# Patient Record
Sex: FEMALE | Race: WHITE | Employment: FULL TIME | URBAN - NONMETROPOLITAN AREA
[De-identification: names, ages, dates, MRNs, and addresses within clinical notes are randomized per-mention and may not be internally consistent; named-entity substitution may affect disease eponyms.]

---

## 2021-08-11 ENCOUNTER — APPOINTMENT (OUTPATIENT)
Dept: GENERAL RADIOLOGY | Age: 46
End: 2021-08-11
Payer: MEDICARE

## 2021-08-11 ENCOUNTER — HOSPITAL ENCOUNTER (EMERGENCY)
Age: 46
Discharge: HOME | End: 2021-08-11
Attending: EMERGENCY MEDICINE
Payer: MEDICARE

## 2021-08-11 VITALS
BODY MASS INDEX: 30.73 KG/M2 | OXYGEN SATURATION: 97 % | HEIGHT: 64 IN | WEIGHT: 180 LBS | RESPIRATION RATE: 22 BRPM | HEART RATE: 56 BPM | TEMPERATURE: 97.7 F

## 2021-08-11 DIAGNOSIS — S46.811A STRAIN OF RIGHT TRAPEZIUS MUSCLE, INITIAL ENCOUNTER: Primary | ICD-10-CM

## 2021-08-11 PROCEDURE — 73030 X-RAY EXAM OF SHOULDER: CPT

## 2021-08-11 PROCEDURE — 99283 EMERGENCY DEPT VISIT LOW MDM: CPT

## 2021-08-11 RX ORDER — NAPROXEN 500 MG/1
500 TABLET ORAL 2 TIMES DAILY PRN
Qty: 20 TABLET | Refills: 0 | Status: SHIPPED | OUTPATIENT
Start: 2021-08-11 | End: 2021-08-21

## 2021-08-11 RX ORDER — METHOCARBAMOL 500 MG/1
500 TABLET, FILM COATED ORAL 4 TIMES DAILY
Qty: 40 TABLET | Refills: 0 | Status: SHIPPED | OUTPATIENT
Start: 2021-08-11 | End: 2021-08-21

## 2021-08-11 RX ORDER — LIDOCAINE 50 MG/G
1 PATCH TOPICAL DAILY
Qty: 30 PATCH | Refills: 0 | Status: SHIPPED | OUTPATIENT
Start: 2021-08-11

## 2021-08-11 NOTE — ED PROVIDER NOTES
Emergency Department Encounter    Patient: Indira Garcia  MRN: [id number]  : 1975  Date of Evaluation: 2021  ED Provider:  Leatha Hinson MD    Triage Chief Complaint:   Shoulder Injury (pt reached to scratch back heard a  pop yesterday. very painful with movement. right shoulder)    [place]:  Indira Garcia is a 54 y.o. female that presents with complaint of right shoulder pain. It is over her posterior shoulder radiates up toward her neck and down toward her middle of her back and shoulder blade. On the right side, worse with movement. She has no swelling of the shoulder itself. No pain at the elbow or the hand. No numbness or tingling in the arm. She works as a  at [employer], does a lot of manual labor. She wanted to make sure that she was not reinjuring it by working through the pain. She is not having any weakness in the arm. Used a Lidoderm patch yesterday with some improvement. Not painful if not moving it. She noted the pain when she reached to scratch her back with her arm behind her and heard a pop. No trauma      ROS - see HPI, below listed is current ROS at time of my eval:  4 systems reviewed negative except as above      History reviewed. No pertinent past medical history. History reviewed. No pertinent surgical history. History reviewed. No pertinent family history.   Social History     Socioeconomic History    Marital status: Single     Spouse name: Not on file    Number of children: Not on file    Years of education: Not on file    Highest education level: Not on file   Occupational History    Not on file   Tobacco Use    Smoking status: Former Smoker    Smokeless tobacco: Never Used   Substance and Sexual Activity    Alcohol use: Yes     Comment: occ    Drug use: Not Currently    Sexual activity: Not on file   Other Topics Concern    Not on file   Social History Narrative    Not on file     Social Determinants of Health     Financial Resource Strain:  Difficulty of Paying Living Expenses:    Food Insecurity:     Worried About Running Out of Food in the Last Year:     Ran Out of Food in the Last Year:    Transportation Needs:     Lack of Transportation (Medical):  Lack of Transportation (Non-Medical):    Physical Activity:     Days of Exercise per Week:     Minutes of Exercise per Session:    Stress:     Feeling of Stress :    Social Connections:     Frequency of Communication with Friends and Family:     Frequency of Social Gatherings with Friends and Family:     Attends Denominational Services:     Active Member of Clubs or Organizations:     Attends Club or Organization Meetings:     Marital Status:    Intimate Partner Violence:     Fear of Current or Ex-Partner:     Emotionally Abused:     Physically Abused:     Sexually Abused:      No current facility-administered medications for this encounter. Current Outpatient Medications   Medication Sig Dispense Refill    methocarbamol (ROBAXIN) 500 MG tablet Take 1 tablet by mouth 4 times daily for 10 days 40 tablet 0    naproxen (NAPROSYN) 500 MG tablet Take 1 tablet by mouth 2 times daily as needed for Pain 20 tablet 0    lidocaine (LIDODERM) 5 % Place 1 patch onto the skin daily 12 hours on, 12 hours off. 30 patch 0     No Known Allergies    Nursing Notes Reviewed    Physical Exam:  ED Triage Vitals [08/11/21 1123]   Enc Vitals Group      BP       Pulse 56      Resp 22      Temp 97.7 °F (36.5 °C)      Temp Source Oral      SpO2 97 %      Weight 180 lb (81.6 kg)      Height 5' 4\" (1.626 m)      Head Circumference       Peak Flow       Pain Score       Pain Loc       Pain Edu? Excl. in [address]? My pulse ox interpretation is - normal    General appearance:  No acute distress. Skin:  Warm. Dry. Eye:  Extraocular movements intact. Ears, nose, mouth and throat:  Oral mucosa moist   Neck:  Trachea midline. Extremity:  No swelling. Normal ROM.   Right trapezius tenderness, as well as right shoulder blade tenderness without specific muscle spasm. Nontender over the anterior shoulder or clavicle. Full range of motion at the shoulder, she can reach back behind her with her hand flat on her back, she can fully abduct the shoulder. No pain with movement at the elbow or wrist when isolated. Full strength in the right arm and sensation intact in median radial and ulnar nerve distribution. Pulses are intact in the right arm. No rashes. No midline C or T-spine tenderness  Heart:  Regular rate and rhythm  Perfusion:  intact  Respiratory:  Respirations nonlabored. Abdominal:  Non distended. Neurological:  Alert and oriented          Psychiatric:  Appropriate    I have reviewed and interpreted all of the currently available lab results from this visit (if applicable):  No results found for this visit on 08/11/21. Radiographs (if obtained):  Radiologist's Report Reviewed:  No results found. EKG (if obtained): (All EKG's are interpreted by myself in the absence of a cardiologist)      MDM:  48-year-old female with history as above presents with right shoulder pain, appears to be more trapezius pain on exam, x-rays negative for bony injury. I do suspect a muscle strain. Plan for Robaxin, naproxen and Lidoderm patches for home. We discussed not lifting more than 20 pounds, rest, follow-up with PCP. She is tender specifically over the trapezius muscle on the right, no rashes, no other concerning signs or symptoms on exam or history. Plan for discharge home, given return precautions. She is comfortable with plan    Clinical Impression:  1. Strain of right trapezius muscle, initial encounter      Disposition referral (if applicable):  your doctor          Disposition medications (if applicable):  New Prescriptions    LIDOCAINE (LIDODERM) 5 %    Place 1 patch onto the skin daily 12 hours on, 12 hours off.     METHOCARBAMOL (ROBAXIN) 500 MG TABLET    Take 1 tablet by mouth 4 times daily for 10 days    NAPROXEN (NAPROSYN) 500 MG TABLET    Take 1 tablet by mouth 2 times daily as needed for Pain     ED Provider Disposition Time  DISPOSITION Decision To Discharge 08/11/2021 12:10:38 PM      Comment: Please note this report has been produced using speech recognition software and may contain errors related to that system including errors in grammar, punctuation, and spelling, as well as words and phrases that may be inappropriate. Efforts were made to edit the dictations.         Leatha Hinson MD  08/11/21 9007

## 2021-08-11 NOTE — ED NOTES
Patient given AVS, discharge instructions and prescriptions. Verbalizes understanding no further needs identified at this time.      Joe Meckel, RN  08/11/21 3779

## 2023-01-23 ENCOUNTER — HOSPITAL ENCOUNTER (OUTPATIENT)
Age: 48
Discharge: HOME | End: 2023-01-23
Payer: MEDICARE

## 2023-01-23 ENCOUNTER — OFFICE VISIT (OUTPATIENT)
Dept: INTERNAL MEDICINE CLINIC | Age: 48
End: 2023-01-23
Payer: MEDICARE

## 2023-01-23 ENCOUNTER — HOSPITAL ENCOUNTER (OUTPATIENT)
Dept: GENERAL RADIOLOGY | Age: 48
Discharge: HOME | End: 2023-01-23
Payer: MEDICARE

## 2023-01-23 VITALS — HEIGHT: 64 IN | HEART RATE: 74 BPM | WEIGHT: 180 LBS | OXYGEN SATURATION: 98 % | BODY MASS INDEX: 30.73 KG/M2

## 2023-01-23 DIAGNOSIS — S69.91XA INJURY OF RIGHT WRIST, INITIAL ENCOUNTER: ICD-10-CM

## 2023-01-23 DIAGNOSIS — S69.91XA INJURY OF RIGHT WRIST, INITIAL ENCOUNTER: Primary | ICD-10-CM

## 2023-01-23 PROCEDURE — G8484 FLU IMMUNIZE NO ADMIN: HCPCS | Performed by: PHYSICIAN ASSISTANT

## 2023-01-23 PROCEDURE — G8428 CUR MEDS NOT DOCUMENT: HCPCS | Performed by: PHYSICIAN ASSISTANT

## 2023-01-23 PROCEDURE — G8417 CALC BMI ABV UP PARAM F/U: HCPCS | Performed by: PHYSICIAN ASSISTANT

## 2023-01-23 PROCEDURE — 73110 X-RAY EXAM OF WRIST: CPT

## 2023-01-23 PROCEDURE — 73130 X-RAY EXAM OF HAND: CPT

## 2023-01-23 PROCEDURE — 99213 OFFICE O/P EST LOW 20 MIN: CPT | Performed by: PHYSICIAN ASSISTANT

## 2023-01-23 PROCEDURE — 1036F TOBACCO NON-USER: CPT | Performed by: PHYSICIAN ASSISTANT

## 2023-01-23 ASSESSMENT — ENCOUNTER SYMPTOMS
CONSTIPATION: 0
EYE PAIN: 0
COLOR CHANGE: 0
ABDOMINAL PAIN: 0
PHOTOPHOBIA: 0
WHEEZING: 0
DIARRHEA: 0
SHORTNESS OF BREATH: 0
VOMITING: 0
BLOOD IN STOOL: 0
EYE DISCHARGE: 0
RHINORRHEA: 0
SORE THROAT: 0
COUGH: 0
BACK PAIN: 0
NAUSEA: 0
EYE REDNESS: 0
CHEST TIGHTNESS: 0

## 2023-01-23 NOTE — PROGRESS NOTES
Omkar Willis (:  1975) is a 49 y.o. female,New patient, here for evaluation of the following chief complaint(s):    No chief complaint on file. This is my first patient encounter with Omkar Willis; chart reviewed. SUBJECTIVE/OBJECTIVE:  SHIRA Willis is a pleasant 49 y.o. female presenting to clinic today for right hand and wrist injury. Patient reports she was sliding yesterday down a hill, reports she was in forward facing prone position with arms out when she had a bump on sled and sled landed on top of her right wrist and hand. Patient reports immediate bruising and swelling; reports pain has worsened into today; patient did work today at [employer]. Patient has not taken anything for medications, has not iced or heated area. Patient reports pain is significant with wrist movements and does have some pain in her hand also. Patient denies any head injury, loss of consciousness etc.    No Known Allergies    Current Outpatient Medications   Medication Sig Dispense Refill    naproxen (NAPROSYN) 500 MG tablet Take 1 tablet by mouth 2 times daily as needed for Pain 20 tablet 0    lidocaine (LIDODERM) 5 % Place 1 patch onto the skin daily 12 hours on, 12 hours off. 30 patch 0     No current facility-administered medications for this visit. Pulse 74   Ht 5' 4\" (1.626 m)   Wt 180 lb (81.6 kg)   SpO2 98%   BMI 30.90 kg/m²     Review of Systems   Constitutional:  Negative for appetite change, chills, fatigue and fever. HENT:  Negative for congestion, ear pain, hearing loss, rhinorrhea and sore throat. Eyes:  Negative for photophobia, pain, discharge and redness. Respiratory:  Negative for cough, chest tightness, shortness of breath and wheezing. Cardiovascular:  Negative for chest pain, palpitations and leg swelling. Gastrointestinal:  Negative for abdominal pain, blood in stool, constipation, diarrhea, nausea and vomiting. Endocrine: Negative for polyuria.    Genitourinary: Negative for difficulty urinating, dysuria, flank pain, frequency, hematuria and urgency. Musculoskeletal:  Positive for arthralgias and joint swelling. Negative for back pain and gait problem. Skin:  Negative for color change and rash. Neurological:  Negative for dizziness, syncope, weakness, light-headedness and headaches. Hematological:  Negative for adenopathy. Psychiatric/Behavioral:  Negative for agitation, behavioral problems and suicidal ideas. The patient is not nervous/anxious. Physical Exam  HENT:      Head: Normocephalic and atraumatic. Right Ear: External ear normal.      Left Ear: External ear normal.   Cardiovascular:      Rate and Rhythm: Regular rhythm. Pulses: Normal pulses. Pulmonary:      Effort: No respiratory distress. Musculoskeletal:         General: Swelling and tenderness present. No deformity. Normal range of motion. Comments: Right wrist is moderately swollen compared to left; anterior wrist and forearm is swollen and erythematous, significant tender palpation throughout right wrist and base of thumb, mild tenderness to base of fourth finger, mild decreased  strength, pain with ulnar and radial deviation at rest, no pain with wrist flexion and extension. Normal distal sensation, motor function, pulses. Skin:     General: Skin is warm and dry. Findings: Bruising present. Neurological:      General: No focal deficit present. Mental Status: She is alert and oriented to person, place, and time. Mental status is at baseline. Psychiatric:         Behavior: Behavior normal.       ASSESSMENT/PLAN:  1. Injury of right wrist, initial encounter   -Concern for fracture, will obtain x-ray, referral placed to orthopedics; discussed that even if x-ray does not show fracture, if pain persist beyond the next 2 weeks, may need to have close follow-up for repeat imaging. Stressed importance of immobilization, ice/heat, elevation. NSAIDs as needed. Reviewed proper use, monitoring, side effects of management as discussed. Return to clinic or report to emergency department if symptoms worsen, change, persist.    -     XR HAND RIGHT (MIN 3 VIEWS); Future  -     XR WRIST RIGHT (MIN 3 VIEWS); Future  -     [address]    Return in about 1 week (around 1/30/2023), or if symptoms worsen or fail to improve, for Follow Up. An electronic signature was used to authenticate this note.     --GURDEEP Avila